# Patient Record
Sex: FEMALE | Race: WHITE
[De-identification: names, ages, dates, MRNs, and addresses within clinical notes are randomized per-mention and may not be internally consistent; named-entity substitution may affect disease eponyms.]

---

## 2017-03-30 ENCOUNTER — HOSPITAL ENCOUNTER (OUTPATIENT)
Age: 52
Discharge: HOME | End: 2017-03-30
Payer: COMMERCIAL

## 2017-03-30 DIAGNOSIS — Z82.62: ICD-10-CM

## 2017-03-30 DIAGNOSIS — Z82.49: ICD-10-CM

## 2017-03-30 DIAGNOSIS — D12.8: Primary | ICD-10-CM

## 2017-03-30 DIAGNOSIS — Z80.3: ICD-10-CM

## 2017-03-30 DIAGNOSIS — D12.2: ICD-10-CM

## 2017-03-30 DIAGNOSIS — Z88.2: ICD-10-CM

## 2017-03-30 DIAGNOSIS — K57.30: ICD-10-CM

## 2017-03-30 PROCEDURE — 45385 COLONOSCOPY W/LESION REMOVAL: CPT

## 2017-03-30 PROCEDURE — 0DBP8ZX EXCISION OF RECTUM, VIA NATURAL OR ARTIFICIAL OPENING ENDOSCOPIC, DIAGNOSTIC: ICD-10-PCS | Performed by: SURGERY

## 2017-03-30 PROCEDURE — 45380 COLONOSCOPY AND BIOPSY: CPT

## 2017-03-30 PROCEDURE — 0DBK8ZX EXCISION OF ASCENDING COLON, VIA NATURAL OR ARTIFICIAL OPENING ENDOSCOPIC, DIAGNOSTIC: ICD-10-PCS | Performed by: SURGERY

## 2022-07-10 ENCOUNTER — HOSPITAL ENCOUNTER (OUTPATIENT)
Dept: HOSPITAL 76 - DI.S | Age: 57
Discharge: HOME | End: 2022-07-10
Attending: NURSE PRACTITIONER
Payer: COMMERCIAL

## 2022-07-10 DIAGNOSIS — R92.8: ICD-10-CM

## 2022-07-10 DIAGNOSIS — Z12.31: Primary | ICD-10-CM

## 2022-07-10 DIAGNOSIS — Z80.3: ICD-10-CM

## 2022-07-22 NOTE — MAMMOGRAPHY REPORT
BILATERAL DIGITAL SCREENING MAMMOGRAM 3D/2D WITH EXAGGERATED CC: 7/10/2022

 

CLINICAL: Routine screening. Family history of breast cancer.  

 

Comparison is made to exams dated:  2/12/2016 mammogram and 12/12/2014 mammogram - [facility].  The tissue of both breasts is heterogeneously dense. This may lower the sensitivity of m
ammography.  

 

There is an irregular equal density asymmetry with an obscured margin in the right breast at 11 o'danish
ck middle depth.  This is more prominent and decreased in size.  

No other significant masses, calcifications, or other findings are seen in either breast.  

 

IMPRESSION: INCOMPLETE: NEEDS ADDITIONAL IMAGING EVALUATION

The irregular equal density asymmetry in the right breast is indeterminate.  Additional views with po
ssible ultrasound are recommended.  

 

 

Based on Tyrer-Cuzick model (a risk assessment model), the patient's lifetime risk is 25.1% and her 1
0 year risk is 9.2%. If a patient has an elevated risk, a more comprehensive evaluation should be con
sidered and/or a referral to a genetic counselor. The American Cancer Society, American College of Ra
diology, and NCCN Guidelines advise the consideration of Breast MRI as an adjunct to screening mammog
zoila in patients whose "Lifetime risk to develop breast cancer" is 20% or higher.

 

 

This exam was interpreted at Station [facility]: [id number].  

 

NOTE: For mammograms, a report in lay terms will be sent to the patient. Approximately 15% of breast 
malignancies will not be visualized mammographically. In the management of a palpable breast mass, a 
negative mammogram must not discourage biopsy of a clinically suspicious lesion.

 

Electronically Signed By: Joyce stinson/santhosh:7/22/2022 10:57:38  

 

 

 

ACR BI-RADS Category 0: Incomplete 3340F

PARENCHYMAL PATTERN: (D) - The breast(s) demonstrate(s) heterogeneously dense fibroglandular parenchy
ma.

BI-RADS CATEGORY: (0) - 0

Mammo and US

[id number]

Immediate follow-up

LATERALITY: (B)

## 2022-09-27 ENCOUNTER — HOSPITAL ENCOUNTER (OUTPATIENT)
Dept: HOSPITAL 76 - SDS | Age: 57
Discharge: HOME | End: 2022-09-27
Attending: SURGERY
Payer: COMMERCIAL

## 2022-09-27 VITALS — DIASTOLIC BLOOD PRESSURE: 80 MMHG | SYSTOLIC BLOOD PRESSURE: 127 MMHG

## 2022-09-27 DIAGNOSIS — K57.30: ICD-10-CM

## 2022-09-27 DIAGNOSIS — Z86.010: ICD-10-CM

## 2022-09-27 DIAGNOSIS — K21.9: Primary | ICD-10-CM

## 2022-09-27 DIAGNOSIS — K29.50: ICD-10-CM

## 2022-09-27 DIAGNOSIS — K22.2: ICD-10-CM

## 2022-09-27 DIAGNOSIS — K31.7: ICD-10-CM

## 2022-09-27 DIAGNOSIS — K44.9: ICD-10-CM

## 2022-09-27 PROCEDURE — 45378 DIAGNOSTIC COLONOSCOPY: CPT

## 2022-09-27 PROCEDURE — 0DB78ZX EXCISION OF STOMACH, PYLORUS, VIA NATURAL OR ARTIFICIAL OPENING ENDOSCOPIC, DIAGNOSTIC: ICD-10-PCS | Performed by: SURGERY

## 2022-09-27 PROCEDURE — 43249 ESOPH EGD DILATION <30 MM: CPT

## 2022-09-27 PROCEDURE — 43239 EGD BIOPSY SINGLE/MULTIPLE: CPT

## 2022-09-27 NOTE — ANESTHESIA POST OP EVALUATION
Anesthesia Post Eval





- Post Anesthesia Eval


Vitals: 





                                Last Vital Signs











Temp  36.6 C   09/27/22 10:18


 


Pulse  60   09/27/22 10:18


 


Resp  16   09/27/22 10:18


 


BP  127/80   09/27/22 10:18


 


Pulse Ox  100   09/27/22 10:18


 


O2 Flow Rate  0   09/27/22 08:32











CV Function Including HR & BP: Stable


Pain Control: Satisfactory


Nausea & Vomiting: Negative


Mental Status: Baseline


Respiratory Status: Airway Patent


Hydration Status: Satisfactory


Anesthesia Complications: None

## 2022-09-27 NOTE — ANESTHESIA
Pre-Anesthesia VS, & Labs





- Diagnosis





GERD, hx colon polyps





- Procedure





EGD, Colonoscopy


Vital Signs: 





                                        











Temp Pulse Resp BP Pulse Ox O2 Flow Rate


 


 37 C   89   16   140/94 H  100   0 


 


 09/27/22 08:32  09/27/22 08:32  09/27/22 08:32  09/27/22 08:32  09/27/22 08:32 

09/27/22 08:32











Height: 5 ft 1 in


Weight (kg): 58 kg


Body Mass Index: 24.1


BMI Classification: Normal





- NPO


>8 hours





- Pregnancy


Is Patient Pregnant?: No





- Lab Results


Lab results reviewed: Yes





Home Medications and Allergies





                                        





No Known Home Medications  04/22/15 








Allergies/Adverse Reactions: 


                                    Allergies











Allergy/AdvReac Type Severity Reaction Status Date / Time


 


amoxicillin Allergy  Hives Verified 09/27/22 08:56


 


Sulfa (Sulfonamide Allergy  Rash Verified 03/30/17 09:25





Antibiotics)     














Anes History & Medical History





- Anesthetic History


Anesthesia Complications: reports: No previous complications


Family history of Anesthesia Complications: Denies


Family history of Malignant Hyperthermia: Denies





- Medical History


Cardiovascular: reports: None


Pulmonary: reports: None


Gastrointestinal: reports: GERD


Urinary: reports: None


Musculoskeletal: reports: None


Endocrine/Autoimmune: reports: None


Skin: reports: None


Smoking Status: Never smoker





- Surgical History


General: reports: Colonoscopy





Exam


General: Alert, Oriented x3, Cooperative


Dental: WNL


Mouth Opening: 3 Fingerbreadth


Neck Mobility: Normal


Mallampati classification: II


Thyromental Distance: 4-6 cm


Respiratory: Lungs clear, Normal breath sounds, No respiratory distress


Cardiovascular: Regular rate


Neurological: Normal speech


Mental/Cognitive Status: Alert/Oriented X3, Normal for patient


Cognitive Status: Within normal limits





Plan


Anesthesia Type: Total IV


Consent for Procedure(s) Verified and Reviewed: Yes


Code Status: Attempt Resuscitation


ASA classification: 2-Mild systemic disease


Is this case an emergency?: No